# Patient Record
Sex: FEMALE | Employment: FULL TIME | URBAN - METROPOLITAN AREA
[De-identification: names, ages, dates, MRNs, and addresses within clinical notes are randomized per-mention and may not be internally consistent; named-entity substitution may affect disease eponyms.]

---

## 2017-01-27 ENCOUNTER — APPOINTMENT (OUTPATIENT)
Dept: ULTRASOUND IMAGING | Age: 22
DRG: 781 | End: 2017-01-27
Attending: PHYSICIAN ASSISTANT
Payer: COMMERCIAL

## 2017-01-27 ENCOUNTER — HOSPITAL ENCOUNTER (INPATIENT)
Age: 22
LOS: 2 days | Discharge: HOME | DRG: 781 | End: 2017-01-29
Attending: OBSTETRICS & GYNECOLOGY | Admitting: OBSTETRICS & GYNECOLOGY
Payer: COMMERCIAL

## 2017-01-27 PROBLEM — K85.90 PANCREATITIS, ACUTE: Status: ACTIVE | Noted: 2017-01-27

## 2017-01-27 PROBLEM — K85.90 ACUTE PANCREATITIS: Status: ACTIVE | Noted: 2017-01-27

## 2017-01-27 PROBLEM — Z3A.30 30 WEEKS GESTATION OF PREGNANCY: Status: ACTIVE | Noted: 2017-01-27

## 2017-01-27 LAB
ALBUMIN SERPL BCP-MCNC: 2.2 G/DL (ref 3.4–5)
ALBUMIN/GLOB SERPL: 0.7 {RATIO} (ref 0.8–1.7)
ALP SERPL-CCNC: 84 U/L (ref 45–117)
ALT SERPL-CCNC: 80 U/L (ref 13–56)
AMYLASE SERPL-CCNC: 274 U/L (ref 25–115)
ANION GAP BLD CALC-SCNC: 9 MMOL/L (ref 3–18)
APPEARANCE UR: CLEAR
AST SERPL W P-5'-P-CCNC: 48 U/L (ref 15–37)
BILIRUB SERPL-MCNC: 0.6 MG/DL (ref 0.2–1)
BILIRUB UR QL: NEGATIVE
BUN SERPL-MCNC: 8 MG/DL (ref 7–18)
BUN/CREAT SERPL: 14 (ref 12–20)
CALCIUM SERPL-MCNC: 7.6 MG/DL (ref 8.5–10.1)
CHLORIDE SERPL-SCNC: 105 MMOL/L (ref 100–108)
CHOLEST SERPL-MCNC: 181 MG/DL
CO2 SERPL-SCNC: 23 MMOL/L (ref 21–32)
COLOR UR: ABNORMAL
CREAT SERPL-MCNC: 0.59 MG/DL (ref 0.6–1.3)
ERYTHROCYTE [DISTWIDTH] IN BLOOD BY AUTOMATED COUNT: 13.4 % (ref 11.6–14.5)
GLOBULIN SER CALC-MCNC: 3.2 G/DL (ref 2–4)
GLUCOSE SERPL-MCNC: 162 MG/DL (ref 74–99)
GLUCOSE UR QL STRIP.AUTO: NEGATIVE MG/DL
HCT VFR BLD AUTO: 30.9 % (ref 35–45)
HDLC SERPL-MCNC: 49 MG/DL (ref 40–60)
HDLC SERPL: 3.7 {RATIO} (ref 0–5)
HGB BLD-MCNC: 10 G/DL (ref 12–16)
KETONES UR-MCNC: 40 MG/DL
LDLC SERPL CALC-MCNC: 115 MG/DL (ref 0–100)
LEUKOCYTE ESTERASE UR QL STRIP: NEGATIVE
LIPASE SERPL-CCNC: 1176 U/L (ref 73–393)
LIPID PROFILE,FLP: ABNORMAL
MCH RBC QN AUTO: 27.5 PG (ref 24–34)
MCHC RBC AUTO-ENTMCNC: 32.4 G/DL (ref 31–37)
MCV RBC AUTO: 84.9 FL (ref 74–97)
NITRITE UR QL: NEGATIVE
PH UR: 6 [PH] (ref 5–8)
PLATELET # BLD AUTO: 182 K/UL (ref 135–420)
PMV BLD AUTO: 11.7 FL (ref 9.2–11.8)
POTASSIUM SERPL-SCNC: 3.3 MMOL/L (ref 3.5–5.5)
PROT SERPL-MCNC: 5.4 G/DL (ref 6.4–8.2)
PROT UR QL: ABNORMAL MG/DL
RBC # BLD AUTO: 3.64 M/UL (ref 4.2–5.3)
RBC # UR STRIP: NEGATIVE /UL
SODIUM SERPL-SCNC: 137 MMOL/L (ref 136–145)
SP GR UR: >=1.03 (ref 1–1.03)
TRIGL SERPL-MCNC: 85 MG/DL (ref ?–150)
UROBILINOGEN UR QL: 1 EU/DL (ref 0.2–1)
VLDLC SERPL CALC-MCNC: 17 MG/DL
WBC # BLD AUTO: 7.7 K/UL (ref 4.6–13.2)

## 2017-01-27 PROCEDURE — 80061 LIPID PANEL: CPT | Performed by: PHYSICIAN ASSISTANT

## 2017-01-27 PROCEDURE — 77030020254 HC SOL INJ D5LR LACTATED RINGER

## 2017-01-27 PROCEDURE — 76705 ECHO EXAM OF ABDOMEN: CPT

## 2017-01-27 PROCEDURE — 74011258636 HC RX REV CODE- 258/636: Performed by: OBSTETRICS & GYNECOLOGY

## 2017-01-27 PROCEDURE — 74011250636 HC RX REV CODE- 250/636: Performed by: OBSTETRICS & GYNECOLOGY

## 2017-01-27 PROCEDURE — 83690 ASSAY OF LIPASE: CPT | Performed by: OBSTETRICS & GYNECOLOGY

## 2017-01-27 PROCEDURE — 80053 COMPREHEN METABOLIC PANEL: CPT | Performed by: OBSTETRICS & GYNECOLOGY

## 2017-01-27 PROCEDURE — 36415 COLL VENOUS BLD VENIPUNCTURE: CPT | Performed by: OBSTETRICS & GYNECOLOGY

## 2017-01-27 PROCEDURE — 65270000029 HC RM PRIVATE

## 2017-01-27 PROCEDURE — 81003 URINALYSIS AUTO W/O SCOPE: CPT

## 2017-01-27 PROCEDURE — 77030032490 HC SLV COMPR SCD KNE COVD -B

## 2017-01-27 PROCEDURE — 74011000250 HC RX REV CODE- 250: Performed by: OBSTETRICS & GYNECOLOGY

## 2017-01-27 PROCEDURE — 99218 HC RM OBSERVATION: CPT

## 2017-01-27 PROCEDURE — 82150 ASSAY OF AMYLASE: CPT | Performed by: OBSTETRICS & GYNECOLOGY

## 2017-01-27 PROCEDURE — 74011000258 HC RX REV CODE- 258: Performed by: PHYSICIAN ASSISTANT

## 2017-01-27 PROCEDURE — 77030020255 HC SOL INJ LR 1000ML BG

## 2017-01-27 PROCEDURE — 85027 COMPLETE CBC AUTOMATED: CPT | Performed by: OBSTETRICS & GYNECOLOGY

## 2017-01-27 RX ORDER — DEXTROSE MONOHYDRATE AND SODIUM CHLORIDE 5; .45 G/100ML; G/100ML
125 INJECTION, SOLUTION INTRAVENOUS CONTINUOUS
Status: DISCONTINUED | OUTPATIENT
Start: 2017-01-27 | End: 2017-01-28

## 2017-01-27 RX ORDER — SODIUM CHLORIDE, SODIUM LACTATE, POTASSIUM CHLORIDE, CALCIUM CHLORIDE 600; 310; 30; 20 MG/100ML; MG/100ML; MG/100ML; MG/100ML
125 INJECTION, SOLUTION INTRAVENOUS CONTINUOUS
Status: DISCONTINUED | OUTPATIENT
Start: 2017-01-27 | End: 2017-01-29 | Stop reason: HOSPADM

## 2017-01-27 RX ORDER — SODIUM CHLORIDE 0.9 % (FLUSH) 0.9 %
5-10 SYRINGE (ML) INJECTION AS NEEDED
Status: DISCONTINUED | OUTPATIENT
Start: 2017-01-27 | End: 2017-01-29 | Stop reason: HOSPADM

## 2017-01-27 RX ORDER — ALBUTEROL SULFATE 0.83 MG/ML
SOLUTION RESPIRATORY (INHALATION)
COMMUNITY

## 2017-01-27 RX ORDER — SODIUM CHLORIDE 0.9 % (FLUSH) 0.9 %
5-10 SYRINGE (ML) INJECTION EVERY 8 HOURS
Status: DISCONTINUED | OUTPATIENT
Start: 2017-01-27 | End: 2017-01-28

## 2017-01-27 RX ORDER — ONDANSETRON 2 MG/ML
6 INJECTION INTRAMUSCULAR; INTRAVENOUS
Status: DISCONTINUED | OUTPATIENT
Start: 2017-01-27 | End: 2017-01-29 | Stop reason: HOSPADM

## 2017-01-27 RX ORDER — DEXTROSE, SODIUM CHLORIDE, SODIUM LACTATE, POTASSIUM CHLORIDE, AND CALCIUM CHLORIDE 5; .6; .31; .03; .02 G/100ML; G/100ML; G/100ML; G/100ML; G/100ML
150 INJECTION, SOLUTION INTRAVENOUS CONTINUOUS
Status: DISCONTINUED | OUTPATIENT
Start: 2017-01-27 | End: 2017-01-27

## 2017-01-27 RX ORDER — BETAMETHASONE SODIUM PHOSPHATE AND BETAMETHASONE ACETATE 3; 3 MG/ML; MG/ML
12 INJECTION, SUSPENSION INTRA-ARTICULAR; INTRALESIONAL; INTRAMUSCULAR; SOFT TISSUE EVERY 24 HOURS
Status: COMPLETED | OUTPATIENT
Start: 2017-01-27 | End: 2017-01-28

## 2017-01-27 RX ORDER — ONDANSETRON 2 MG/ML
4 INJECTION INTRAMUSCULAR; INTRAVENOUS ONCE
Status: COMPLETED | OUTPATIENT
Start: 2017-01-27 | End: 2017-01-27

## 2017-01-27 RX ADMIN — DEXTROSE MONOHYDRATE AND SODIUM CHLORIDE 125 ML/HR: 5; .45 INJECTION, SOLUTION INTRAVENOUS at 22:25

## 2017-01-27 RX ADMIN — PROMETHAZINE HYDROCHLORIDE 25 MG: 25 INJECTION INTRAMUSCULAR; INTRAVENOUS at 09:55

## 2017-01-27 RX ADMIN — SODIUM CHLORIDE, SODIUM LACTATE, POTASSIUM CHLORIDE, CALCIUM CHLORIDE, AND DEXTROSE MONOHYDRATE 150 ML/HR: 600; 310; 30; 20; 5 INJECTION, SOLUTION INTRAVENOUS at 13:46

## 2017-01-27 RX ADMIN — SODIUM CHLORIDE, SODIUM LACTATE, POTASSIUM CHLORIDE, CALCIUM CHLORIDE, AND DEXTROSE MONOHYDRATE 150 ML/HR: 600; 310; 30; 20; 5 INJECTION, SOLUTION INTRAVENOUS at 08:00

## 2017-01-27 RX ADMIN — BETAMETHASONE SODIUM PHOSPHATE AND BETAMETHASONE ACETATE 12 MG: 3; 3 INJECTION, SUSPENSION INTRA-ARTICULAR; INTRALESIONAL; INTRAMUSCULAR at 15:38

## 2017-01-27 RX ADMIN — DEXTROSE MONOHYDRATE AND SODIUM CHLORIDE 125 ML/HR: 5; .45 INJECTION, SOLUTION INTRAVENOUS at 14:30

## 2017-01-27 RX ADMIN — ONDANSETRON 4 MG: 2 SOLUTION INTRAMUSCULAR; INTRAVENOUS at 05:00

## 2017-01-27 RX ADMIN — SODIUM CHLORIDE, SODIUM LACTATE, POTASSIUM CHLORIDE, AND CALCIUM CHLORIDE 1000 ML: 600; 310; 30; 20 INJECTION, SOLUTION INTRAVENOUS at 05:08

## 2017-01-27 NOTE — PROGRESS NOTES
Dr. Heide Degroot in the room, discussed poc with ptestefany, made aware of the urine results, ordered more lab works.

## 2017-01-27 NOTE — CONSULTS
[facility]  Hospitalist Division    Consult Note    Patient: Michele Lee MRN: [id number]  CSN: [id number]    YOB: 1995  Age: 23 y.o. Sex: female    DOA: 2017 LOS:  LOS: 0 days        Requesting Physician:  Dr. Osbaldo Robertson  Reason for Consultation:  Medical Management    Chief Complaint:  Abdominal pain and vomiting    Assessment/Plan     Patient Active Problem List   Diagnosis Code    Acute pancreatitis K85.90    30 weeks gestation of pregnancy Z3A.30       A/P:  - Acute pancreatitis - Most common cause would be gallstone pancreatitis. Check U/S and lipid panel. GI consulted. Keep NPO. Start IV fluids. PRN pain medication and antiemetics  - 30 week 4d pregnancy - Management per OBGYN team  - SCD's for DVT Prophylaxis      HPI:     Michele Lee is a 23 y.o. female with a hx of Asthma and is  AB0 30 weeks pregnant who was admitted last evening for abdominal pain. She complains of nausea, vomiting, abdominal pain and diarrhea that started about 1am. She vomited multiple times at home and her family brought her in to be evaluated about 4 am. She continues to experience nausea and vomiting. Her abdominal pain is located in her upper abdomen, more epigastric in nature. She was found to have abnormal labs with an ALT of 80, AST 48, and lipase of 1176. She has no history of pancreatitis. She takes no home medications other than a multivitamin. She has no history of abdominal surgery. She does not drink alcohol. She has been admitted for observation by the OBGYN team and the Hospitalist team has been consulted for medical management. Past Medical History   Diagnosis Date    Asthma        No past surgical history on file. No family history on file.     Social History     Social History    Marital status: SINGLE     Spouse name: N/A    Number of children: N/A    Years of education: N/A     Social History Main Topics    Smoking status: Never Smoker    Smokeless tobacco: Not on file    Alcohol use No    Drug use: No    Sexual activity: Not Currently     Partners: Male     Other Topics Concern    Not on file     Social History Narrative    No narrative on file       Prior to Admission medications    Medication Sig Start Date End Date Taking? Authorizing Provider   albuterol (PROVENTIL VENTOLIN) 2.5 mg /3 mL (0.083 %) nebulizer solution by Nebulization route every four (4) hours as needed for Wheezing or Shortness of Breath. Yes Historical Provider   prenatal multivit-ca-min-fe-fa tab Take  by mouth. Indications: Pregnancy   Yes Historical Provider       Allergies   Allergen Reactions    Penicillin G Hives    Tree Nuts Swelling       Review of Systems  - fever, - chills, - fatigue, - weight loss, - night sweats   - sore throat, - sinus congestion, - lymphadenopathy, - vision changes  - CP, -  palpitations  - dyspnea on exertion, - dyspnea at rest, - cough, - hemoptysis  + nausea, + vomiting, + diarrhea, + abdominal pain, - reflux, - dysphagia  - dysuria, - hematuria, - urinary frequency  - rash, - pruritis  - back pain, - neck pain, - myalgia, - arthralgia  - H/A, - numbness, - tingling, - weakness, - slurred speech    Physical Exam:      Visit Vitals    /67 (BP 1 Location: Left arm, BP Patient Position: Lying left side)    Pulse (!) 103    Temp 99.1 °F (37.3 °C)    Resp 18    Ht 5' 7\" (1.702 m)    Wt 96.6 kg (213 lb)    SpO2 97%    BMI 33.36 kg/m2       Physical Exam:  Gen: In general, this is a well nourished female, in no acute distress on RA. HEENT: Sclerae nonicteric. Oral mucous membranes moist.    Neck: Supple with midline trachea. CV: RRR without murmur or rub appreciated. Resp:Respirations are unlabored without use of accessory muscles. Lung fields bilaterally without wheezes or rhonchi. Abd: Gravid abdomen. + LUQ and epigastric abdominal tenderness to palpation  Extrem: Extremities are warm, without cyanosis or clubbing.  No pitting pretibial edema. Palpable distal pulses X 4.   Skin: Warm, no visible rashes. Neuro: Patient is alert, oriented, and cooperative. No obvious focal defects. Moves all 4 extremities. Labs Reviewed:    Recent Results (from the past 24 hour(s))   POC URINE MACROSCOPIC    Collection Time: 01/27/17  8:58 AM   Result Value Ref Range    Color GRACY      Appearance CLEAR      Spec. gravity (POC) >=1.030 1.003 - 1.030      pH, urine  (POC) 6.0 5.0 - 8.0      Protein (POC) TRACE (A) NEG mg/dL    Glucose, urine (POC) NEGATIVE  NEG mg/dL    Ketones (POC) 40 (A) NEG mg/dL    Bilirubin (POC) NEGATIVE  NEG      Blood (POC) NEGATIVE  NEG      Urobilinogen (POC) 1.0 0.2 - 1.0 EU/dL    Nitrite (POC) NEGATIVE  NEG      Leukocyte esterase (POC) NEGATIVE  NEG     CBC W/O DIFF    Collection Time: 01/27/17 10:00 AM   Result Value Ref Range    WBC 7.7 4.6 - 13.2 K/uL    RBC 3.64 (L) 4.20 - 5.30 M/uL    HGB 10.0 (L) 12.0 - 16.0 g/dL    HCT 30.9 (L) 35.0 - 45.0 %    MCV 84.9 74.0 - 97.0 FL    MCH 27.5 24.0 - 34.0 PG    MCHC 32.4 31.0 - 37.0 g/dL    RDW 13.4 11.6 - 14.5 %    PLATELET 654 512 - 657 K/uL    MPV 11.7 9.2 - 88.2 FL   METABOLIC PANEL, COMPREHENSIVE    Collection Time: 01/27/17 10:00 AM   Result Value Ref Range    Sodium 137 136 - 145 mmol/L    Potassium 3.3 (L) 3.5 - 5.5 mmol/L    Chloride 105 100 - 108 mmol/L    CO2 23 21 - 32 mmol/L    Anion gap 9 3.0 - 18 mmol/L    Glucose 162 (H) 74 - 99 mg/dL    BUN 8 7.0 - 18 MG/DL    Creatinine 0.59 (L) 0.6 - 1.3 MG/DL    BUN/Creatinine ratio 14 12 - 20      GFR est AA >60 >60 ml/min/1.73m2    GFR est non-AA >60 >60 ml/min/1.73m2    Calcium 7.6 (L) 8.5 - 10.1 MG/DL    Bilirubin, total 0.6 0.2 - 1.0 MG/DL    ALT 80 (H) 13 - 56 U/L    AST 48 (H) 15 - 37 U/L    Alk.  phosphatase 84 45 - 117 U/L    Protein, total 5.4 (L) 6.4 - 8.2 g/dL    Albumin 2.2 (L) 3.4 - 5.0 g/dL    Globulin 3.2 2.0 - 4.0 g/dL    A-G Ratio 0.7 (L) 0.8 - 1.7     LIPASE    Collection Time: 01/27/17 10:00 AM Result Value Ref Range    Lipase 1176 (H) 73 - 393 U/L   AMYLASE    Collection Time: 01/27/17 10:00 AM   Result Value Ref Range    Amylase 274 (H) 25 - 115 U/L       Imaging Reviewed:    None      Sameera Rodriguez [facility]  Hospitalist Division  Pager:  [phone]  Office:  [phone]

## 2017-01-27 NOTE — PROGRESS NOTES
Dr. Darryl Stroud notified about the lab results, to keep the pt NPO. Order written for brendan Rothman were  made informed and they will  pt at 1300.

## 2017-01-27 NOTE — PROGRESS NOTES
Unable to transfer to ER as it is full right now. Called MD again to report change in status. Will move to [address] [place] [address] room 073 until further notice.

## 2017-01-27 NOTE — PROGRESS NOTES
Patient is a  30w + 4d. Arrived from the ER with complaints of N+V and diarrhea. Patient denies any bleeding, leaking of fluid or decreased fetal movement. Patient placed on the monitor. Vital signs stable and FHR of 135's. Patient unable to keep oral intake down and is unable to void at the moment. A reactive FHR strip noted. Discussed possible plan of care with patient.

## 2017-01-27 NOTE — PROGRESS NOTES
Bedside and Verbal shift change report given to KRISTEN Marin RN, and GINA Velasquez, (oncoming nurse) by ALISON Mayes (offgoing nurse). Report included the following information Kardex, Procedure Summary, Intake/Output, MAR and Recent Results.        Clinton Fisher RN

## 2017-01-27 NOTE — PROGRESS NOTES
Called on call OB. Discussed patient's presentation. MD order to provide IV hydration, 4mg Zofran IV, collect a urine sample and call her back with update.

## 2017-01-27 NOTE — IP AVS SNAPSHOT
Summary of Care Report The Summary of Care report has been created to help improve care coordination. Users with access to [email or web address] or [address] (Web-based application) may access additional patient information including the Discharge Summary. If you are not currently a [address] user and need more information, please call the number listed below in the [place] [address] section and ask to be connected with Medical Records. Facility Information Name Address Phone [facility] [address] [facility] [address] [phone] [phone] Patient Information Patient Name Sex RUSTY Reyes ([id number]) Female 1995 Discharge Information Admitting Provider Service Area Unit Norm Ham, DO / [address] [facility] 3 Mother Baby Unit / [phone] Discharge Provider Discharge Date/Time Discharge Disposition Destination (none) 2017 (Pending) AHR (none) Patient Language Language ENGLISH [13] Problem List as of 2017  Never Reviewed Codes Priority Class Noted - Resolved * (Principal)Acute pancreatitis ICD-10-CM: K85.90 ICD-9-CM: 264.1   2017 - Present 30 weeks gestation of pregnancy ICD-10-CM: Z3A.30 ICD-9-CM: V22.2   2017 - Present Pancreatitis, acute ICD-10-CM: K85.90 ICD-9-CM: 824.0   2017 - Present Elevated liver enzymes ICD-10-CM: R74.8 ICD-9-CM: 790.5   2017 - Present You are allergic to the following Allergen Reactions Penicillin G Hives Tree Nuts Swelling Current Discharge Medication List  
  
CONTINUE these medications which have NOT CHANGED Dose & Instructions Dispensing Information Comments  
 albuterol 2.5 mg /3 mL (0.083 %) nebulizer solution Commonly known as:  PROVENTIL VENTOLIN  
 by Nebulization route every four (4) hours as needed for Wheezing or Shortness of Breath. Refills:  0  
   
 prenatal multivit-ca-min-fe-fa Tab Take  by mouth. Indications: Pregnancy Refills:  0 Follow-up Information Follow up With Details Comments Contact Info Not On File Bshsi   Not On File (62) Patient has a PCP but that physician is not listed in [address] [facility]. Schedule an appointment as soon as possible for a visit in 3 days Discharge Instructions Pancreatitis: Care Instructions Your Care Instructions The pancreas is an organ behind the stomach. It makes hormones and enzymes to help your body digest food. But if these enzymes attack the pancreas, it can get inflamed. This is called pancreatitis. Most cases are caused by gallstones or by heavy alcohol use. If you take care of yourself at home, it will help you get better. It will also help you avoid more problems with your pancreas. Follow-up care is a key part of your treatment and safety. Be sure to make and go to all appointments, and call your doctor if you are having problems. It's also a good idea to know your test results and keep a list of the medicines you take. How can you care for yourself at home? · Drink clear liquids and eat bland foods until you feel better. [place] foods include rice, dry toast, and crackers. They also include bananas and applesauce. · Eat a low-fat diet until your doctor says your pancreas is healed. · Do not drink alcohol. Tell your doctor if you need help to quit. Counseling, support groups, and sometimes medicines can help you stay sober. · Be safe with medicines. Read and follow all instructions on the label. ¨ If the doctor gave you a prescription medicine for pain, take it as prescribed. ¨ If you are not taking a prescription pain medicine, ask your doctor if you can take an over-the-counter medicine. · If your doctor prescribed antibiotics, take them as directed. Do not stop taking them just because you feel better. You need to take the full course of antibiotics. · Get extra rest until you feel better. To prevent future problems with your pancreas · Do not drink alcohol. · Tell your doctors and pharmacist that you've had pancreatitis. They can help you avoid medicines that may cause this problem again. When should you call for help? Call your doctor now or seek immediate medical care if: 
· You have new or severe belly pain. · You have a new or higher fever. · You can't keep fluid or medicines down. Watch closely for changes in your health, and be sure to contact your doctor if: · The symptoms you had when you first started feeling sick come back. · You do not get better as expected. · You need help to stop drinking alcohol. Where can you learn more? Go to http://jaimee-jess.info/. Enter J521 in the search box to learn more about \"Pancreatitis: Care Instructions. \" Current as of: August 9, 2016 Content Version: 11.1 © 8878-3959 [employer]. Care instructions adapted under license by [employer] (which disclaims liability or warranty for this information). If you have questions about a medical condition or this instruction, always ask your healthcare professional. [address] any warranty or liability for your use of this information. Chart Review Routing History No Routing History on File

## 2017-01-27 NOTE — PROGRESS NOTES
Dr. Shereen Vaca, Hospitalist in the room, discussed the poc,examined the patient, vu, keep pt NPO and blood work in am.

## 2017-01-27 NOTE — PROGRESS NOTES
Subjective:      Patient is a 24 yo G1 at 30w3d gestation who presented last pm complaining of a sudden onset of nausea, vomiting, and diarrhea. She receives prenatal care in [facility] and is in town visiting. She denies any pregnancy complications. She received IV fluids and Zofran last pm, but so far has not been able to tolerate PO intake. she denies contractions, vaginal bleeding, or leaking of fluid and notes good fetal movement. Allergies   Allergen Reactions    Penicillin G Hives    Tree Nuts Swelling     Past Medical History   Diagnosis Date    Asthma      No past surgical history on file. No family history on file. Social History   Substance Use Topics    Smoking status: Never Smoker    Smokeless tobacco: Not on file    Alcohol use No      Review of Systems    Pertinent items are noted in HPI. Objective:     Patient Vitals for the past 8 hrs:   Temp Pulse Resp BP SpO2   01/27/17 0800 98.9 °F (37.2 °C) 95 16 115/73 100 %   01/27/17 0422 98.9 °F (37.2 °C) 96 16 124/77 -     :Body mass index is 33.36 kg/(m^2). General appearance: no distress, appears stated age, sleeping  Abdomen: soft, non-tender. No masses,  no organomegaly, fundus soft and non-tender  Pelvic: deferred    UA: SG >1.030, trace protein, 40 ketones       Assessment/Plan:     30w4d IUP with gastroenteritis, most likely viral.  Will continue IVF and antiemetics.

## 2017-01-27 NOTE — H&P
History & Physical    Name: Pearlene Skiff MRN: [id number]  SSN: [id number]    YOB: 1995  Age: 23 y.o. Sex: female      Subjective:     Reason for Admission:  Pregnancy and pancreatitis    History of Present Illness: Ms. Sharri Rodriguez is a 23 y.o.  female with an estimated gestational age of 30w3d with Estimated Date of Delivery: 4/3/17. Patient complains of severe nausea/vomiting and moderate right upper quadrant pain for 1 days. Pregnancy has been complicated by nothing. . Patient denies chest pain, contractions, fever, headache , pelvic pressure, shortness of breath, vaginal bleeding , vaginal leaking of fluid , visual disturbances and dysuria. Patient is an unassigned patient, gets prenatal care by an outside provider. OB History    Para Term  AB SAB TAB Ectopic Multiple Living   1 0              # Outcome Date GA Lbr Sameer/2nd Weight Sex Delivery Anes PTL Lv   1 Current                 Past Medical History   Diagnosis Date    Asthma      History reviewed. No pertinent past surgical history. Social History     Occupational History    Not on file. Social History Main Topics    Smoking status: Never Smoker    Smokeless tobacco: Not on file    Alcohol use No    Drug use: No    Sexual activity: Not Currently     Partners: Male      History reviewed. No pertinent family history. Allergies   Allergen Reactions    Penicillin G Hives    Tree Nuts Swelling     Prior to Admission medications    Medication Sig Start Date End Date Taking? Authorizing Provider   albuterol (PROVENTIL VENTOLIN) 2.5 mg /3 mL (0.083 %) nebulizer solution by Nebulization route every four (4) hours as needed for Wheezing or Shortness of Breath. Yes Historical Provider   prenatal multivit-ca-min-fe-fa tab Take  by mouth.  Indications: Pregnancy   Yes Historical Provider        Review of Systems:  Constitutional: negative for fevers and chills  Respiratory: negative for cough, hemoptysis or pleurisy/chest pain  Cardiovascular: negative for chest pressure/discomfort, dyspnea  Gastrointestinal: negative except for nausea, vomiting and diarrhea  Genitourinary:negative for dysuria and hematuria  Musculoskeletal:negative for myalgias and arthralgias  Neurological: negative for headaches and dizziness     Objective:     Vitals:    Vitals:    17 0422 17 0426 17 0800 17 1136   BP: 124/77  115/73 109/67   Pulse: 96  95 (!) 103   Resp: 16  16 18   Temp: 98.9 °F (37.2 °C)  98.9 °F (37.2 °C) 99.1 °F (37.3 °C)   SpO2:   100% 97%   Weight:  213 lb (96.6 kg)     Height:  5' 7\" (1.702 m)        Temp (24hrs), Av °F (37.2 °C), Min:98.9 °F (37.2 °C), Max:99.1 °F (37.3 °C)    BP  Min: 109/67  Max: 124/77     Physical Exam:  Patient without distress. Back: costovertebral angle tenderness absent  Abdomen: tenderness in the epigastrium - mild (patient recently got medicated)  Fundus: soft and non tender  Perineum: blood absent, amniotic fluid absent  Cervical Exam: deferred  Lower Extremities:  - Edema No   - No evidence of DVT seen on physical exam.   - Patellar Reflexes: 1+ bilaterally     Membranes:  Intact  Uterine Activity:  None  Fetal Heart Rate:  Cat I upon arrival in L&D, currently not being monitored.          Lab/Data Review:  Recent Results (from the past 24 hour(s))   POC URINE MACROSCOPIC    Collection Time: 17  8:58 AM   Result Value Ref Range    Color GRACY      Appearance CLEAR      Spec. gravity (POC) >=1.030 1.003 - 1.030      pH, urine  (POC) 6.0 5.0 - 8.0      Protein (POC) TRACE (A) NEG mg/dL    Glucose, urine (POC) NEGATIVE  NEG mg/dL    Ketones (POC) 40 (A) NEG mg/dL    Bilirubin (POC) NEGATIVE  NEG      Blood (POC) NEGATIVE  NEG      Urobilinogen (POC) 1.0 0.2 - 1.0 EU/dL    Nitrite (POC) NEGATIVE  NEG      Leukocyte esterase (POC) NEGATIVE  NEG     CBC W/O DIFF    Collection Time: 17 10:00 AM   Result Value Ref Range    WBC 7.7 4.6 - 13.2 K/uL    RBC 3.64 (L) 4.20 - 5.30 M/uL    HGB 10.0 (L) 12.0 - 16.0 g/dL    HCT 30.9 (L) 35.0 - 45.0 %    MCV 84.9 74.0 - 97.0 FL    MCH 27.5 24.0 - 34.0 PG    MCHC 32.4 31.0 - 37.0 g/dL    RDW 13.4 11.6 - 14.5 %    PLATELET 458 297 - 919 K/uL    MPV 11.7 9.2 - 77.8 FL   METABOLIC PANEL, COMPREHENSIVE    Collection Time: 01/27/17 10:00 AM   Result Value Ref Range    Sodium 137 136 - 145 mmol/L    Potassium 3.3 (L) 3.5 - 5.5 mmol/L    Chloride 105 100 - 108 mmol/L    CO2 23 21 - 32 mmol/L    Anion gap 9 3.0 - 18 mmol/L    Glucose 162 (H) 74 - 99 mg/dL    BUN 8 7.0 - 18 MG/DL    Creatinine 0.59 (L) 0.6 - 1.3 MG/DL    BUN/Creatinine ratio 14 12 - 20      GFR est AA >60 >60 ml/min/1.73m2    GFR est non-AA >60 >60 ml/min/1.73m2    Calcium 7.6 (L) 8.5 - 10.1 MG/DL    Bilirubin, total 0.6 0.2 - 1.0 MG/DL    ALT 80 (H) 13 - 56 U/L    AST 48 (H) 15 - 37 U/L    Alk. phosphatase 84 45 - 117 U/L    Protein, total 5.4 (L) 6.4 - 8.2 g/dL    Albumin 2.2 (L) 3.4 - 5.0 g/dL    Globulin 3.2 2.0 - 4.0 g/dL    A-G Ratio 0.7 (L) 0.8 - 1.7     LIPASE    Collection Time: 01/27/17 10:00 AM   Result Value Ref Range    Lipase 1176 (H) 73 - 393 U/L   AMYLASE    Collection Time: 01/27/17 10:00 AM   Result Value Ref Range    Amylase 274 (H) 25 - 115 U/L   LIPID PANEL    Collection Time: 01/27/17 10:00 AM   Result Value Ref Range    LIPID PROFILE          Cholesterol, total 181 <200 MG/DL    Triglyceride 85 <150 MG/DL    HDL Cholesterol 49 40 - 60 MG/DL    LDL, calculated 115 (H) 0 - 100 MG/DL    VLDL, calculated 17 MG/DL    CHOL/HDL Ratio 3.7 0 - 5.0         Assessment and Plan:     Principal Problem:    Acute pancreatitis (1/27/2017)    Active Problems:    30 weeks gestation of pregnancy (1/27/2017)      Pancreatitis, acute (1/27/2017)       IV hydration, antiemetics, pain meds PRN. Will give ANCS. Keep NPO. Abdominal US ordered. Internal Medicine consult    I have verbalized the plan of care with patient.   The patient was given a full opportunity to ask questions and indicated that her questions had been answered.          Signed By:  Tawana Dunbar,      January 27, 2017

## 2017-01-27 NOTE — PROGRESS NOTES
After IV hydration, still unable to void for sample per MD request. Reactive strip throughout treatment. No vomiting but pt continues to feel a generalized weakness/nausea. MD on call cleared from OB standpoint.  Will discharge to ER for further possible Flu/GI workup per Dr. Pernell Rinne,

## 2017-01-27 NOTE — IP AVS SNAPSHOT
Current Discharge Medication List  
  
ASK your doctor about these medications Dose & Instructions Dispensing Information Comments Morning Noon Evening Bedtime  
 albuterol 2.5 mg /3 mL (0.083 %) nebulizer solution Commonly known as:  PROVENTIL VENTOLIN Your next dose is: Today, Tomorrow Other:  ____________  
   
   
 by Nebulization route every four (4) hours as needed for Wheezing or Shortness of Breath. Refills:  0  
     
   
   
   
  
 prenatal multivit-ca-min-fe-fa Tab Your next dose is: Today, Tomorrow Other:  ____________ Take  by mouth. Indications: Pregnancy Refills:  0
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
## 2017-01-28 PROBLEM — R74.8 ELEVATED LIVER ENZYMES: Status: ACTIVE | Noted: 2017-01-28

## 2017-01-28 LAB
ABO + RH BLD: NORMAL
ALBUMIN SERPL BCP-MCNC: 2.3 G/DL (ref 3.4–5)
ALBUMIN/GLOB SERPL: 0.7 {RATIO} (ref 0.8–1.7)
ALP SERPL-CCNC: 84 U/L (ref 45–117)
ALT SERPL-CCNC: 91 U/L (ref 13–56)
ANION GAP BLD CALC-SCNC: 14 MMOL/L (ref 3–18)
AST SERPL W P-5'-P-CCNC: 53 U/L (ref 15–37)
BASOPHILS # BLD AUTO: 0 K/UL (ref 0–0.06)
BASOPHILS # BLD: 0 % (ref 0–2)
BILIRUB DIRECT SERPL-MCNC: 0.3 MG/DL (ref 0–0.2)
BILIRUB SERPL-MCNC: 0.7 MG/DL (ref 0.2–1)
BLOOD GROUP ANTIBODIES SERPL: NORMAL
BUN SERPL-MCNC: 4 MG/DL (ref 7–18)
BUN/CREAT SERPL: 8 (ref 12–20)
CALCIUM SERPL-MCNC: 7.6 MG/DL (ref 8.5–10.1)
CHLORIDE SERPL-SCNC: 105 MMOL/L (ref 100–108)
CO2 SERPL-SCNC: 21 MMOL/L (ref 21–32)
CREAT SERPL-MCNC: 0.5 MG/DL (ref 0.6–1.3)
DIFFERENTIAL METHOD BLD: ABNORMAL
EOSINOPHIL # BLD: 0 K/UL (ref 0–0.4)
EOSINOPHIL NFR BLD: 0 % (ref 0–5)
ERYTHROCYTE [DISTWIDTH] IN BLOOD BY AUTOMATED COUNT: 13.6 % (ref 11.6–14.5)
GLOBULIN SER CALC-MCNC: 3.4 G/DL (ref 2–4)
GLUCOSE SERPL-MCNC: 120 MG/DL (ref 74–99)
HCT VFR BLD AUTO: 29.8 % (ref 35–45)
HGB BLD-MCNC: 9.9 G/DL (ref 12–16)
LIPASE SERPL-CCNC: 106 U/L (ref 73–393)
LYMPHOCYTES # BLD AUTO: 5 % (ref 21–52)
LYMPHOCYTES # BLD: 0.4 K/UL (ref 0.9–3.6)
MCH RBC QN AUTO: 28.3 PG (ref 24–34)
MCHC RBC AUTO-ENTMCNC: 33.2 G/DL (ref 31–37)
MCV RBC AUTO: 85.1 FL (ref 74–97)
MONOCYTES # BLD: 0.5 K/UL (ref 0.05–1.2)
MONOCYTES NFR BLD AUTO: 6 % (ref 3–10)
NEUTS SEG # BLD: 6.9 K/UL (ref 1.8–8)
NEUTS SEG NFR BLD AUTO: 89 % (ref 40–73)
PLATELET # BLD AUTO: 187 K/UL (ref 135–420)
PMV BLD AUTO: 12.6 FL (ref 9.2–11.8)
POTASSIUM SERPL-SCNC: 3.2 MMOL/L (ref 3.5–5.5)
PROT SERPL-MCNC: 5.7 G/DL (ref 6.4–8.2)
RBC # BLD AUTO: 3.5 M/UL (ref 4.2–5.3)
SODIUM SERPL-SCNC: 140 MMOL/L (ref 136–145)
SPECIMEN EXP DATE BLD: NORMAL
WBC # BLD AUTO: 7.8 K/UL (ref 4.6–13.2)

## 2017-01-28 PROCEDURE — 83690 ASSAY OF LIPASE: CPT | Performed by: PHYSICIAN ASSISTANT

## 2017-01-28 PROCEDURE — 80048 BASIC METABOLIC PNL TOTAL CA: CPT | Performed by: PHYSICIAN ASSISTANT

## 2017-01-28 PROCEDURE — 85025 COMPLETE CBC W/AUTO DIFF WBC: CPT | Performed by: PHYSICIAN ASSISTANT

## 2017-01-28 PROCEDURE — 80076 HEPATIC FUNCTION PANEL: CPT | Performed by: PHYSICIAN ASSISTANT

## 2017-01-28 PROCEDURE — 36415 COLL VENOUS BLD VENIPUNCTURE: CPT | Performed by: OBSTETRICS & GYNECOLOGY

## 2017-01-28 PROCEDURE — 65270000029 HC RM PRIVATE

## 2017-01-28 PROCEDURE — 74011250636 HC RX REV CODE- 250/636: Performed by: OBSTETRICS & GYNECOLOGY

## 2017-01-28 PROCEDURE — 74011000258 HC RX REV CODE- 258: Performed by: PHYSICIAN ASSISTANT

## 2017-01-28 PROCEDURE — 81003 URINALYSIS AUTO W/O SCOPE: CPT | Performed by: OBSTETRICS & GYNECOLOGY

## 2017-01-28 PROCEDURE — 86900 BLOOD TYPING SEROLOGIC ABO: CPT | Performed by: OBSTETRICS & GYNECOLOGY

## 2017-01-28 PROCEDURE — 74011250636 HC RX REV CODE- 250/636: Performed by: HOSPITALIST

## 2017-01-28 RX ORDER — SODIUM CHLORIDE 9 MG/ML
75 INJECTION, SOLUTION INTRAVENOUS CONTINUOUS
Status: DISCONTINUED | OUTPATIENT
Start: 2017-01-28 | End: 2017-01-29 | Stop reason: HOSPADM

## 2017-01-28 RX ADMIN — DEXTROSE MONOHYDRATE AND SODIUM CHLORIDE 125 ML/HR: 5; .45 INJECTION, SOLUTION INTRAVENOUS at 06:41

## 2017-01-28 RX ADMIN — Medication 10 ML: at 18:26

## 2017-01-28 RX ADMIN — BETAMETHASONE SODIUM PHOSPHATE AND BETAMETHASONE ACETATE 12 MG: 3; 3 INJECTION, SUSPENSION INTRA-ARTICULAR; INTRALESIONAL; INTRAMUSCULAR at 16:33

## 2017-01-28 RX ADMIN — SODIUM CHLORIDE 75 ML/HR: 900 INJECTION, SOLUTION INTRAVENOUS at 10:50

## 2017-01-28 NOTE — ROUTINE PROCESS
Assumed pt care, pt resting quietly in bed, re informed of NPO status. Monitors applied for NST, consents signed and BP/Temp obtained.      Tessy Summers reobtianed, patient up watching TV, call bell in reach, asked if there is anything she needs she declines, will continue to monitor

## 2017-01-28 NOTE — PROGRESS NOTES
Patient resting comfortably, no complaints made at this time. IVF of D5.45 NaCl infusing at 125cc/hr.

## 2017-01-28 NOTE — PROGRESS NOTES
PROGRESS NOTE   PATIENT:  Lety Becerra           MRN: [id number]           [facility]/[address], [address]           1/28/2017, 11:54 AM      ISUBJECTIVE:  No abdominal pain, nausea or vomiting. Hungry. Diet ordered. OBJECTIVE:  Patient Vitals for the past 24 hrs:   Temp Pulse Resp BP SpO2   01/28/17 0323 98.7 °F (37.1 °C) - - 126/67 -   01/27/17 2015 99 °F (37.2 °C) - - 130/62 -   01/27/17 1547 99.4 °F (37.4 °C) 99 20 100/52 95 %         Intake/Output Summary (Last 24 hours) at 01/28/17 1154  Last data filed at 01/27/17 1315   Gross per 24 hour   Intake             1000 ml   Output                0 ml   Net             1000 ml       Gen: NAD  Heent: No pallor, icterus  Lungs: Clear B/L   CVS exam: Regular rate and rhythm   Abd  : Soft, non tender,gravid abdomen consistent with gestation, BS +, No masses felt. .    Labs: Results:   Chemistry Recent Labs      01/28/17   0545  01/27/17   1000   GLU  120*  162*   NA  140  137   K  3.2*  3.3*   CL  105  105   CO2  21  23   BUN  4*  8   CREA  0.50*  0.59*   CA  7.6*  7.6*   AGAP  14  9   BUCR  8*  14   AP  84  84   TP  5.7*  5.4*   ALB  2.3*  2.2*   GLOB  3.4  3.2   AGRAT  0.7*  0.7*    Estimated Creatinine Clearance: 214.2 mL/min (based on Cr of 0.5).    CBC w/Diff Recent Labs      01/28/17   0545  01/27/17   1000   WBC  7.8  7.7   RBC  3.50*  3.64*   HGB  9.9*  10.0*   HCT  29.8*  30.9*   PLT  187  182   GRANS  89*   --    LYMPH  5*   --    EOS  0   --                   Amylase Lipase    Liver Enzymes Recent Labs      01/28/17   0545  01/27/17   1000   TP  5.7*  5.4*   ALB  2.3*  2.2*   TBILI  0.7  0.6   AP  84  84   SGOT  53*  48*   ALT  91*  80*            Allergies   Allergen Reactions    Penicillin G Hives    Tree Nuts Swelling       Current Facility-Administered Medications   Medication Dose Route Frequency    0.9% sodium chloride infusion  75 mL/hr IntraVENous CONTINUOUS    promethazine (PHENERGAN) with saline injection 25 mg  25 mg IntraVENous Q4H PRN    lactated ringers infusion  125 mL/hr IntraVENous CONTINUOUS    sodium chloride (NS) flush 5-10 mL  5-10 mL IntraVENous PRN    betamethasone (CELESTONE) injection 12 mg  12 mg IntraMUSCular Q24H    ondansetron (ZOFRAN) injection 6 mg  6 mg IntraVENous Q6H PRN       ASSESSMENT:  Principal Problem:    Acute pancreatitis (1/27/2017)    Active Problems:    30 weeks gestation of pregnancy (1/27/2017)      Pancreatitis, acute (1/27/2017)      Elevated liver enzymes (1/28/2017)      Probable mild pancreatitis secondary to microlithiasis, resolving. Mild elevation in aminotransferases, stable. Will continue to monitor for now. Continue current management.     Franck Dumont MD

## 2017-01-28 NOTE — PROGRESS NOTES
Ante Partum Pregnancy Note    Patient admitted for pancreatitis. Patient states she does not  have  abdominal pain  , contractions, right upper quadrant pain  , vaginal bleeding  and vaginal leaking of fluid  Denies nausea/vomiting. LOS:  2 days  Vitals: Temp (24hrs), Av.1 °F (37.3 °C), Min:98.7 °F (37.1 °C), Max:99.4 °F (37.4 °C)   Patient Vitals for the past 24 hrs:   BP   17 0323 126/67   17 2015 130/62   17 1547 100/52   17 1136 109/67       I&O:                  1901 -  0700  In:  [I.V.:]  Out: -     Exam:  Patient without distress.                Abdomen: soft, non-tender               Fundus: soft and non tender               Right Upper Quadrant: non-tender               Perineum: No sign of blood or amniotic fluid               Lower Extremities: No               Patellar Reflexes: 1+ bilaterally               Clonus: absent   Uterine Activity: None               NST:  reactive           Lab/Data Review:  CMP:   Lab Results   Component Value Date/Time     2017 05:45 AM    K 3.2 (L) 2017 05:45 AM     2017 05:45 AM    CO2 21 2017 05:45 AM    AGAP 14 2017 05:45 AM     (H) 2017 05:45 AM    BUN 4 (L) 2017 05:45 AM    CREA 0.50 (L) 2017 05:45 AM    GFRAA >60 2017 05:45 AM    GFRNA >60 2017 05:45 AM    CA 7.6 (L) 2017 05:45 AM    ALB 2.3 (L) 2017 05:45 AM    TP 5.7 (L) 2017 05:45 AM    GLOB 3.4 2017 05:45 AM    AGRAT 0.7 (L) 2017 05:45 AM    SGOT 53 (H) 2017 05:45 AM    ALT 91 (H) 2017 05:45 AM     CBC:   Lab Results   Component Value Date/Time    WBC 7.8 2017 05:45 AM    HGB 9.9 (L) 2017 05:45 AM    HCT 29.8 (L) 2017 05:45 AM     2017 05:45 AM       Assessment and Plan:      Principal Problem:    Acute pancreatitis (2017)    Active Problems:    30 weeks gestation of pregnancy (2017)      Pancreatitis, acute (1/27/2017)          Appreciate medicine input. Suspect pancreatitis due to viral illness/gastroenteritis. Abdominal US negative for gallstones. Patient markedly improved clinically. Lipase normal, LFTs mildly elevated. Await hepatitis panel. Will start on a low fat diet and see how she tolerates. Patient to get ANCS dose #2. If patient continues to do well, may be able to send home this evening in stable condition. Will DC if urine ketones negative. I have verbalized the plan of care with patient. The patient was given a full opportunity to ask questions and indicated that her questions had been answered.

## 2017-01-28 NOTE — ROUTINE PROCESS
Verbal shift change report given to Beto Bhatia RN (oncoming nurse) by Ce Sterling RN (offgoing nurse). Report included the following information Kardex, Intake/Output, MAR and Recent Results. 0950--assessment done--denies pain--no n/v--voiding without difficulty--last BM was yesterday-NPO maintained. 1050--may have food and fluids--ice water given--IV fluids/rate adjusted as ordered. 1255--NST completed--Reactive for gestation  1510--specimen container given for urine sample  1630--resting comfortably--some back discomfort from being in bed is reported--tolerating fluids and food--plans to shower at some time. 1830--prepared patient to take a shower--linens changed. 1920--finished shower--feels much better--IV reconnected--resting comfortably--urine obtained.

## 2017-01-28 NOTE — PROGRESS NOTES
Medicine Progress Note    Patient: Stan Baker   Age:  23 y.o.  DOA: 1/27/2017   Admit Dx / CC: Maternity  Maternity  Pancreatitis, acute  LOS:  LOS: 1 day     Assessment/Plan   Principal Problem:    Acute pancreatitis (1/27/2017)    Active Problems:    30 weeks gestation of pregnancy (1/27/2017)      Pancreatitis, acute (1/27/2017)        Additional Plan notes     - Acute pancreatitis - Lipase to normal, abdominal pain improved, IVF change to NS, give regular low fat diet, PRN pain medication and antiemetics  - 30 week 4d pregnancy - Management per OBGYN team  - LFT- minor elevations, would keep an eye on them, acute hep panel pending    DISPO   Anticipated Date of Discharge: per primary  Anticipated Disposition (home, SNF) : per primary    Subjective:   Patient seen and examined. In good spirits, abdominal pain much improved    Objective:     Visit Vitals    /67    Pulse 99    Temp 98.7 °F (37.1 °C)    Resp 20    Ht 5' 7\" (1.702 m)    Wt 96.6 kg (213 lb)    SpO2 95%    Breastfeeding Unknown    BMI 33.36 kg/m2       Physical Exam:  General appearance: alert, cooperative, no distress, appears stated age  Head: Normocephalic, without obvious abnormality, atraumatic  Neck: supple, trachea midline  Lungs: clear to auscultation bilaterally  Heart: regular rate and rhythm, S1, S2 normal, no murmur, click, rub or gallop  Abdomen: soft, non-tender.  Bowel sounds normal. No masses,  no organomegaly  Extremities: extremities normal, atraumatic, no cyanosis or edema  Skin: Skin color, texture, turgor normal. No rashes or lesions  Neurologic: Grossly normal  PSY: Mood and affect normal, appropriately behaved    Intake and Output:  Current Shift:     Last three shifts:  01/26 1901 - 01/28 0700  In: 2000 [I.V.:2000]  Out: -     Lab/Data Reviewed:  CMP:   Lab Results   Component Value Date/Time     01/28/2017 05:45 AM    K 3.2 (L) 01/28/2017 05:45 AM     01/28/2017 05:45 AM    CO2 21 01/28/2017 05:45 AM    AGAP 14 01/28/2017 05:45 AM     (H) 01/28/2017 05:45 AM    BUN 4 (L) 01/28/2017 05:45 AM    CREA 0.50 (L) 01/28/2017 05:45 AM    GFRAA >60 01/28/2017 05:45 AM    GFRNA >60 01/28/2017 05:45 AM    CA 7.6 (L) 01/28/2017 05:45 AM    ALB 2.3 (L) 01/28/2017 05:45 AM    TP 5.7 (L) 01/28/2017 05:45 AM    GLOB 3.4 01/28/2017 05:45 AM    AGRAT 0.7 (L) 01/28/2017 05:45 AM    SGOT 53 (H) 01/28/2017 05:45 AM    ALT 91 (H) 01/28/2017 05:45 AM     CBC:   Lab Results   Component Value Date/Time    WBC 7.8 01/28/2017 05:45 AM    HGB 9.9 (L) 01/28/2017 05:45 AM    HCT 29.8 (L) 01/28/2017 05:45 AM     01/28/2017 05:45 AM       Medications Reviewed:  Current Facility-Administered Medications   Medication Dose Route Frequency    0.9% sodium chloride infusion  75 mL/hr IntraVENous CONTINUOUS    promethazine (PHENERGAN) with saline injection 25 mg  25 mg IntraVENous Q4H PRN    lactated ringers infusion  125 mL/hr IntraVENous CONTINUOUS    sodium chloride (NS) flush 5-10 mL  5-10 mL IntraVENous PRN    betamethasone (CELESTONE) injection 12 mg  12 mg IntraMUSCular Q24H    ondansetron (ZOFRAN) injection 6 mg  6 mg IntraVENous Q6H PRN       Laura Vazquez MD    January 28, 2017
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
## 2017-01-29 VITALS
OXYGEN SATURATION: 100 % | SYSTOLIC BLOOD PRESSURE: 116 MMHG | DIASTOLIC BLOOD PRESSURE: 70 MMHG | TEMPERATURE: 98.2 F | RESPIRATION RATE: 18 BRPM | HEART RATE: 79 BPM | BODY MASS INDEX: 33.43 KG/M2 | HEIGHT: 67 IN | WEIGHT: 213 LBS

## 2017-01-29 LAB
ALBUMIN SERPL BCP-MCNC: 2.2 G/DL (ref 3.4–5)
ALBUMIN/GLOB SERPL: 0.6 {RATIO} (ref 0.8–1.7)
ALP SERPL-CCNC: 79 U/L (ref 45–117)
ALT SERPL-CCNC: 79 U/L (ref 13–56)
ANION GAP BLD CALC-SCNC: 12 MMOL/L (ref 3–18)
APPEARANCE UR: CLEAR
AST SERPL W P-5'-P-CCNC: 38 U/L (ref 15–37)
BILIRUB SERPL-MCNC: 0.3 MG/DL (ref 0.2–1)
BILIRUB UR QL: NEGATIVE
BUN SERPL-MCNC: 4 MG/DL (ref 7–18)
BUN/CREAT SERPL: 8 (ref 12–20)
CALCIUM SERPL-MCNC: 8 MG/DL (ref 8.5–10.1)
CHLORIDE SERPL-SCNC: 110 MMOL/L (ref 100–108)
CO2 SERPL-SCNC: 22 MMOL/L (ref 21–32)
COLOR UR: ABNORMAL
CREAT SERPL-MCNC: 0.52 MG/DL (ref 0.6–1.3)
GLOBULIN SER CALC-MCNC: 3.4 G/DL (ref 2–4)
GLUCOSE SERPL-MCNC: 117 MG/DL (ref 74–99)
GLUCOSE UR QL STRIP.AUTO: NEGATIVE MG/DL
KETONES UR-MCNC: NEGATIVE MG/DL
LEUKOCYTE ESTERASE UR QL STRIP: ABNORMAL
NITRITE UR QL: NEGATIVE
PH UR: 7 [PH] (ref 5–8)
POTASSIUM SERPL-SCNC: 3.6 MMOL/L (ref 3.5–5.5)
PROT SERPL-MCNC: 5.6 G/DL (ref 6.4–8.2)
PROT UR QL: NEGATIVE MG/DL
RBC # UR STRIP: ABNORMAL /UL
SODIUM SERPL-SCNC: 144 MMOL/L (ref 136–145)
SP GR UR: 1.01 (ref 1–1.03)
UROBILINOGEN UR QL: 2 EU/DL (ref 0.2–1)

## 2017-01-29 PROCEDURE — 59025 FETAL NON-STRESS TEST: CPT

## 2017-01-29 PROCEDURE — 36415 COLL VENOUS BLD VENIPUNCTURE: CPT | Performed by: INTERNAL MEDICINE

## 2017-01-29 PROCEDURE — 74011250636 HC RX REV CODE- 250/636: Performed by: HOSPITALIST

## 2017-01-29 PROCEDURE — 77030020256 HC SOL INJ NACL 0.9%  500ML

## 2017-01-29 PROCEDURE — 80053 COMPREHEN METABOLIC PANEL: CPT | Performed by: INTERNAL MEDICINE

## 2017-01-29 RX ADMIN — SODIUM CHLORIDE 75 ML/HR: 900 INJECTION, SOLUTION INTRAVENOUS at 00:50

## 2017-01-29 NOTE — DISCHARGE INSTRUCTIONS
Pancreatitis: Care Instructions  Your Care Instructions    The pancreas is an organ behind the stomach. It makes hormones and enzymes to help your body digest food. But if these enzymes attack the pancreas, it can get inflamed. This is called pancreatitis. Most cases are caused by gallstones or by heavy alcohol use. If you take care of yourself at home, it will help you get better. It will also help you avoid more problems with your pancreas. Follow-up care is a key part of your treatment and safety. Be sure to make and go to all appointments, and call your doctor if you are having problems. It's also a good idea to know your test results and keep a list of the medicines you take. How can you care for yourself at home? · Drink clear liquids and eat bland foods until you feel better. [place] foods include rice, dry toast, and crackers. They also include bananas and applesauce. · Eat a low-fat diet until your doctor says your pancreas is healed. · Do not drink alcohol. Tell your doctor if you need help to quit. Counseling, support groups, and sometimes medicines can help you stay sober. · Be safe with medicines. Read and follow all instructions on the label. ¨ If the doctor gave you a prescription medicine for pain, take it as prescribed. ¨ If you are not taking a prescription pain medicine, ask your doctor if you can take an over-the-counter medicine. · If your doctor prescribed antibiotics, take them as directed. Do not stop taking them just because you feel better. You need to take the full course of antibiotics. · Get extra rest until you feel better. To prevent future problems with your pancreas  · Do not drink alcohol. · Tell your doctors and pharmacist that you've had pancreatitis. They can help you avoid medicines that may cause this problem again. When should you call for help? Call your doctor now or seek immediate medical care if:  · You have new or severe belly pain.   · You have a new or higher fever. · You can't keep fluid or medicines down. Watch closely for changes in your health, and be sure to contact your doctor if:  · The symptoms you had when you first started feeling sick come back. · You do not get better as expected. · You need help to stop drinking alcohol. Where can you learn more? Go to http://jaimee-jess.info/. Enter U441 in the search box to learn more about \"Pancreatitis: Care Instructions. \"  Current as of: August 9, 2016  Content Version: 11.1  © 5400-3846 [employer]. Care instructions adapted under license by [employer] (which disclaims liability or warranty for this information). If you have questions about a medical condition or this instruction, always ask your healthcare professional. [address] any warranty or liability for your use of this information.

## 2017-01-29 NOTE — DISCHARGE SUMMARY
Antepartum Discharge Summary     Name: Wilian Ospina MRN: [id number]  SSN: [id number]    YOB: 1995  Age: 23 y.o. Sex: female      Admit Date: 1/27/2017    Discharge Date: 1/29/2017     Admitting Physician: Tawana Dunbar DO     Attending Physician:  Mindi Camacho    * Admission Diagnoses: Maternity; Maternity; Pancreatitis, acute    * Discharge Diagnoses:   Hospital Problems as of 1/29/2017  Never Reviewed          Codes Class Noted - Resolved POA    Elevated liver enzymes ICD-10-CM: R74.8  ICD-9-CM: 790.5  1/28/2017 - Present Yes        * (Principal)Acute pancreatitis ICD-10-CM: K85.90  ICD-9-CM: 577.0  1/27/2017 - Present Yes        30 weeks gestation of pregnancy ICD-10-CM: Z3A.30  ICD-9-CM: V22.2  1/27/2017 - Present Yes        Pancreatitis, acute ICD-10-CM: K85.90  ICD-9-CM: 577.0  1/27/2017 - Present Unknown             Lab Results   Component Value Date/Time    ABO/Rh(D) O POSITIVE 01/28/2017 10:40 AM      There is no immunization history on file for this patient. * Discharge Condition: good and stable    * Procedures: none  * No surgery found [facility] Course:    - Patient c/o acute episode of severe nausea/vomiting, found to have elevated lipase. Abdominal US performed and was WNL, no gallstones noted. Internal medicine and GI consulted. Patient given aggressive IV hydration, antiemetics. Patient significantly improved and lipase normalized. LFTs trended down. Patient had no further nausea/vomiting. * Disposition: Home    Discharge Medications:   Current Discharge Medication List      CONTINUE these medications which have NOT CHANGED    Details   albuterol (PROVENTIL VENTOLIN) 2.5 mg /3 mL (0.083 %) nebulizer solution by Nebulization route every four (4) hours as needed for Wheezing or Shortness of Breath.      prenatal multivit-ca-min-fe-fa tab Take  by mouth. Indications: Pregnancy             * Follow-up Care/Patient Instructions:   Activity: Activity as tolerated  Diet: Low fat, Low cholesterol  Wound Care: None needed    Follow-up Information     Follow up With Details Comments Contact Info    Not On File [facility]   Not On File (62) Patient has a PCP but that physician is not listed in [facility].        Schedule an appointment as soon as possible for a visit in 3 days             Signed By:  Preston Lenz DO     January 29, 2017

## 2017-01-29 NOTE — ROUTINE PROCESS
Verbal shift change report given to Leena Gabriel RN (oncoming nurse) by Sean Fuller RN (offgoing nurse). Report included the following information SBAR, Kardex, Intake/Output, MAR and Recent Results.

## 2017-01-29 NOTE — PROGRESS NOTES
Obstetrics Progress Note    Name: Lety Becerra MRN: [id number]  SSN: [id number]    YOB: 1995  Age: 23 y.o. Sex: female      Subjective:      LOS: 2 days    Estimated Date of Delivery: 4/3/17   Gestational Age Today: 27w9d     Patient admitted for nausea and vomiting and pancreatitis. States she does not have abdominal pain  , contractions, fever, nausea and vomiting, pelvic pressure, right upper quadrant pain  , vaginal bleeding  and vaginal leaking of fluid . Objective:     Vitals:  Blood pressure 123/56, pulse 79, temperature 97.6 °F (36.4 °C), resp. rate 16, height 5' 7\" (1.702 m), weight 213 lb (96.6 kg), SpO2 98 %, unknown if currently breastfeeding. Temp (24hrs), Av °F (36.7 °C), Min:97.6 °F (36.4 °C), Max:98.4 °F (73.8 °C)    Systolic (60ELE), HCK:800 , Min:111 , VGF:366      Diastolic (85XXZ), LYW:72, Min:56, Max:83       Intake and Output:          Physical Exam:  Patient without distress. Back: costovertebral angle tenderness absent  Abdomen: soft, nontender  Fundus: soft and non tender  Lower Extremities:  - Edema No   - No evidence of DVT seen on physical exam.   - Patellar Reflexes: 1+ bilaterally       Membranes:  Intact       Labs:   Recent Results (from the past 36 hour(s))   CBC WITH AUTOMATED DIFF    Collection Time: 17  5:45 AM   Result Value Ref Range    WBC 7.8 4.6 - 13.2 K/uL    RBC 3.50 (L) 4.20 - 5.30 M/uL    HGB 9.9 (L) 12.0 - 16.0 g/dL    HCT 29.8 (L) 35.0 - 45.0 %    MCV 85.1 74.0 - 97.0 FL    MCH 28.3 24.0 - 34.0 PG    MCHC 33.2 31.0 - 37.0 g/dL    RDW 13.6 11.6 - 14.5 %    PLATELET 225 191 - 737 K/uL    MPV 12.6 (H) 9.2 - 11.8 FL    NEUTROPHILS 89 (H) 40 - 73 %    LYMPHOCYTES 5 (L) 21 - 52 %    MONOCYTES 6 3 - 10 %    EOSINOPHILS 0 0 - 5 %    BASOPHILS 0 0 - 2 %    ABS. NEUTROPHILS 6.9 1.8 - 8.0 K/UL    ABS. LYMPHOCYTES 0.4 (L) 0.9 - 3.6 K/UL    ABS. MONOCYTES 0.5 0.05 - 1.2 K/UL    ABS. EOSINOPHILS 0.0 0.0 - 0.4 K/UL    ABS.  BASOPHILS 0.0 0.0 - 0.06 K/UL DF AUTOMATED     METABOLIC PANEL, BASIC    Collection Time: 01/28/17  5:45 AM   Result Value Ref Range    Sodium 140 136 - 145 mmol/L    Potassium 3.2 (L) 3.5 - 5.5 mmol/L    Chloride 105 100 - 108 mmol/L    CO2 21 21 - 32 mmol/L    Anion gap 14 3.0 - 18 mmol/L    Glucose 120 (H) 74 - 99 mg/dL    BUN 4 (L) 7.0 - 18 MG/DL    Creatinine 0.50 (L) 0.6 - 1.3 MG/DL    BUN/Creatinine ratio 8 (L) 12 - 20      GFR est AA >60 >60 ml/min/1.73m2    GFR est non-AA >60 >60 ml/min/1.73m2    Calcium 7.6 (L) 8.5 - 10.1 MG/DL   LIPASE    Collection Time: 01/28/17  5:45 AM   Result Value Ref Range    Lipase 106 73 - 393 U/L   HEPATIC FUNCTION PANEL    Collection Time: 01/28/17  5:45 AM   Result Value Ref Range    Protein, total 5.7 (L) 6.4 - 8.2 g/dL    Albumin 2.3 (L) 3.4 - 5.0 g/dL    Globulin 3.4 2.0 - 4.0 g/dL    A-G Ratio 0.7 (L) 0.8 - 1.7      Bilirubin, total 0.7 0.2 - 1.0 MG/DL    Bilirubin, direct 0.3 (H) 0.0 - 0.2 MG/DL    Alk.  phosphatase 84 45 - 117 U/L    AST 53 (H) 15 - 37 U/L    ALT 91 (H) 13 - 56 U/L   TYPE & SCREEN    Collection Time: 01/28/17 10:40 AM   Result Value Ref Range    Crossmatch Expiration 01/31/2017     ABO/Rh(D) Zandra Hatchet POSITIVE     Antibody screen NEG    POC URINE MACROSCOPIC    Collection Time: 01/28/17  8:28 PM   Result Value Ref Range    Color GRACY      Appearance CLEAR      Spec. gravity (POC) 1.010 1.003 - 1.030      pH, urine  (POC) 7.0 5.0 - 8.0      Protein (POC) NEGATIVE  NEG mg/dL    Glucose, urine (POC) NEGATIVE  NEG mg/dL    Ketones (POC) NEGATIVE  NEG mg/dL    Bilirubin (POC) NEGATIVE  NEG      Blood (POC) TRACE (A) NEG      Urobilinogen (POC) 2.0 (H) 0.2 - 1.0 EU/dL    Nitrite (POC) NEGATIVE  NEG      Leukocyte esterase (POC) TRACE (A) NEG     METABOLIC PANEL, COMPREHENSIVE    Collection Time: 01/29/17  6:00 AM   Result Value Ref Range    Sodium 144 136 - 145 mmol/L    Potassium 3.6 3.5 - 5.5 mmol/L    Chloride 110 (H) 100 - 108 mmol/L    CO2 22 21 - 32 mmol/L    Anion gap 12 3.0 - 18 mmol/L Glucose 117 (H) 74 - 99 mg/dL    BUN 4 (L) 7.0 - 18 MG/DL    Creatinine 0.52 (L) 0.6 - 1.3 MG/DL    BUN/Creatinine ratio 8 (L) 12 - 20      GFR est AA >60 >60 ml/min/1.73m2    GFR est non-AA >60 >60 ml/min/1.73m2    Calcium 8.0 (L) 8.5 - 10.1 MG/DL    Bilirubin, total 0.3 0.2 - 1.0 MG/DL    ALT 79 (H) 13 - 56 U/L    AST 38 (H) 15 - 37 U/L    Alk. phosphatase 79 45 - 117 U/L    Protein, total 5.6 (L) 6.4 - 8.2 g/dL    Albumin 2.2 (L) 3.4 - 5.0 g/dL    Globulin 3.4 2.0 - 4.0 g/dL    A-G Ratio 0.6 (L) 0.8 - 1.7         Assessment and Plan:      Principal Problem:    Acute pancreatitis (1/27/2017)    Active Problems:    30 weeks gestation of pregnancy (1/27/2017)      Pancreatitis, acute (1/27/2017)      Elevated liver enzymes (1/28/2017)     Appreciate GI and medicine consultation. Patient clinically improved:  LFTs trending down and tolerated PO intake all day. Will DC home today. Follow up with primary OB as previously scheduled in 3 days. I have verbalized the plan of care with patient. The patient was given a full opportunity to ask questions and indicated that her questions had been answered.        Signed By: Kendra Borrero DO     January 29, 2017

## 2017-01-29 NOTE — ROUTINE PROCESS
Verbal shift change report given to Rosa Justice RN (oncoming nurse) by Varinder Haddad RN (offgoing nurse). Report included the following information Kardex, Intake/Output, MAR and Recent Results. 1020--assessment done--denies pain--tolerating diet--voiding without difficulty--no signs of labor--discharge planned for today--POC for discharge discussed--verbalizes understanding--NST to be done prior to discharge. 1315--discharge instructions reviewed and signed. 1355--discharged via wheelchair in stable condition, undelivered, accompanied by family.